# Patient Record
Sex: MALE | Race: WHITE | Employment: FULL TIME | URBAN - METROPOLITAN AREA
[De-identification: names, ages, dates, MRNs, and addresses within clinical notes are randomized per-mention and may not be internally consistent; named-entity substitution may affect disease eponyms.]

---

## 2020-12-07 ENCOUNTER — APPOINTMENT (OUTPATIENT)
Dept: GENERAL RADIOLOGY | Age: 85
DRG: 309 | End: 2020-12-07
Payer: MEDICARE

## 2020-12-07 ENCOUNTER — HOSPITAL ENCOUNTER (INPATIENT)
Age: 85
LOS: 2 days | Discharge: LEFT BEFORE DISCHARGE | DRG: 309 | End: 2020-12-09
Attending: INTERNAL MEDICINE | Admitting: INTERNAL MEDICINE
Payer: MEDICARE

## 2020-12-07 PROBLEM — I48.91 ATRIAL FIBRILLATION WITH RAPID VENTRICULAR RESPONSE (HCC): Status: ACTIVE | Noted: 2020-12-07

## 2020-12-07 LAB
ALBUMIN SERPL-MCNC: 4.6 G/DL (ref 3.5–4.6)
ALP BLD-CCNC: 104 U/L (ref 35–104)
ALT SERPL-CCNC: 13 U/L (ref 0–41)
ANION GAP SERPL CALCULATED.3IONS-SCNC: 12 MEQ/L (ref 9–15)
APTT: 33.9 SEC (ref 24.4–36.8)
AST SERPL-CCNC: 17 U/L (ref 0–40)
BASOPHILS ABSOLUTE: 0.1 K/UL (ref 0–0.2)
BASOPHILS RELATIVE PERCENT: 0.7 %
BILIRUB SERPL-MCNC: 0.3 MG/DL (ref 0.2–0.7)
BUN BLDV-MCNC: 43 MG/DL (ref 8–23)
CALCIUM SERPL-MCNC: 9.9 MG/DL (ref 8.5–9.9)
CHLORIDE BLD-SCNC: 95 MEQ/L (ref 95–107)
CO2: 27 MEQ/L (ref 20–31)
CREAT SERPL-MCNC: 1.87 MG/DL (ref 0.7–1.2)
EOSINOPHILS ABSOLUTE: 0.1 K/UL (ref 0–0.7)
EOSINOPHILS RELATIVE PERCENT: 1.3 %
GFR AFRICAN AMERICAN: 41.9
GFR NON-AFRICAN AMERICAN: 34.6
GLOBULIN: 3.8 G/DL (ref 2.3–3.5)
GLUCOSE BLD-MCNC: 226 MG/DL (ref 60–115)
GLUCOSE BLD-MCNC: 296 MG/DL (ref 70–99)
HCT VFR BLD CALC: 45.5 % (ref 42–52)
HEMOGLOBIN: 14.8 G/DL (ref 14–18)
INR BLD: 1
LYMPHOCYTES ABSOLUTE: 1.5 K/UL (ref 1–4.8)
LYMPHOCYTES RELATIVE PERCENT: 18 %
MCH RBC QN AUTO: 31.5 PG (ref 27–31.3)
MCHC RBC AUTO-ENTMCNC: 32.6 % (ref 33–37)
MCV RBC AUTO: 96.8 FL (ref 80–100)
MONOCYTES ABSOLUTE: 0.8 K/UL (ref 0.2–0.8)
MONOCYTES RELATIVE PERCENT: 10 %
NEUTROPHILS ABSOLUTE: 5.6 K/UL (ref 1.4–6.5)
NEUTROPHILS RELATIVE PERCENT: 70 %
PDW BLD-RTO: 17.7 % (ref 11.5–14.5)
PERFORMED ON: ABNORMAL
PLATELET # BLD: 236 K/UL (ref 130–400)
POTASSIUM SERPL-SCNC: 4.2 MEQ/L (ref 3.4–4.9)
PRO-BNP: 6992 PG/ML
PROTHROMBIN TIME: 13.5 SEC (ref 12.3–14.9)
RBC # BLD: 4.71 M/UL (ref 4.7–6.1)
SODIUM BLD-SCNC: 134 MEQ/L (ref 135–144)
TOTAL CK: 69 U/L (ref 0–190)
TOTAL PROTEIN: 8.4 G/DL (ref 6.3–8)
TROPONIN: 0.03 NG/ML (ref 0–0.01)
TROPONIN: 0.03 NG/ML (ref 0–0.01)
TSH SERPL DL<=0.05 MIU/L-ACNC: 2.92 UIU/ML (ref 0.44–3.86)
WBC # BLD: 8.1 K/UL (ref 4.8–10.8)

## 2020-12-07 PROCEDURE — 96375 TX/PRO/DX INJ NEW DRUG ADDON: CPT

## 2020-12-07 PROCEDURE — 6370000000 HC RX 637 (ALT 250 FOR IP): Performed by: NURSE PRACTITIONER

## 2020-12-07 PROCEDURE — 2500000003 HC RX 250 WO HCPCS: Performed by: PHYSICIAN ASSISTANT

## 2020-12-07 PROCEDURE — 99285 EMERGENCY DEPT VISIT HI MDM: CPT

## 2020-12-07 PROCEDURE — 85730 THROMBOPLASTIN TIME PARTIAL: CPT

## 2020-12-07 PROCEDURE — 2580000003 HC RX 258: Performed by: PHYSICIAN ASSISTANT

## 2020-12-07 PROCEDURE — 93005 ELECTROCARDIOGRAM TRACING: CPT | Performed by: EMERGENCY MEDICINE

## 2020-12-07 PROCEDURE — 71045 X-RAY EXAM CHEST 1 VIEW: CPT

## 2020-12-07 PROCEDURE — 84443 ASSAY THYROID STIM HORMONE: CPT

## 2020-12-07 PROCEDURE — 83880 ASSAY OF NATRIURETIC PEPTIDE: CPT

## 2020-12-07 PROCEDURE — 82550 ASSAY OF CK (CPK): CPT

## 2020-12-07 PROCEDURE — 80053 COMPREHEN METABOLIC PANEL: CPT

## 2020-12-07 PROCEDURE — 84484 ASSAY OF TROPONIN QUANT: CPT

## 2020-12-07 PROCEDURE — 2060000000 HC ICU INTERMEDIATE R&B

## 2020-12-07 PROCEDURE — 85610 PROTHROMBIN TIME: CPT

## 2020-12-07 PROCEDURE — 85025 COMPLETE CBC W/AUTO DIFF WBC: CPT

## 2020-12-07 PROCEDURE — 36415 COLL VENOUS BLD VENIPUNCTURE: CPT

## 2020-12-07 PROCEDURE — 96365 THER/PROPH/DIAG IV INF INIT: CPT

## 2020-12-07 PROCEDURE — 2580000003 HC RX 258: Performed by: NURSE PRACTITIONER

## 2020-12-07 RX ORDER — SODIUM CHLORIDE 0.9 % (FLUSH) 0.9 %
10 SYRINGE (ML) INJECTION EVERY 12 HOURS SCHEDULED
Status: DISCONTINUED | OUTPATIENT
Start: 2020-12-07 | End: 2020-12-09 | Stop reason: HOSPADM

## 2020-12-07 RX ORDER — DEXTROSE MONOHYDRATE 25 G/50ML
12.5 INJECTION, SOLUTION INTRAVENOUS PRN
Status: DISCONTINUED | OUTPATIENT
Start: 2020-12-07 | End: 2020-12-07 | Stop reason: SDUPTHER

## 2020-12-07 RX ORDER — ALBUTEROL SULFATE 90 UG/1
2 AEROSOL, METERED RESPIRATORY (INHALATION) EVERY 4 HOURS PRN
COMMUNITY
Start: 2020-11-27

## 2020-12-07 RX ORDER — PRAVASTATIN SODIUM 40 MG
40 TABLET ORAL DAILY
COMMUNITY
Start: 2020-11-27

## 2020-12-07 RX ORDER — NICOTINE POLACRILEX 4 MG
15 LOZENGE BUCCAL PRN
Status: DISCONTINUED | OUTPATIENT
Start: 2020-12-07 | End: 2020-12-09 | Stop reason: HOSPADM

## 2020-12-07 RX ORDER — DEXTROSE MONOHYDRATE 50 MG/ML
100 INJECTION, SOLUTION INTRAVENOUS PRN
Status: DISCONTINUED | OUTPATIENT
Start: 2020-12-07 | End: 2020-12-07 | Stop reason: SDUPTHER

## 2020-12-07 RX ORDER — SODIUM CHLORIDE 9 MG/ML
INJECTION, SOLUTION INTRAVENOUS CONTINUOUS
Status: DISCONTINUED | OUTPATIENT
Start: 2020-12-07 | End: 2020-12-08

## 2020-12-07 RX ORDER — SODIUM CHLORIDE 0.9 % (FLUSH) 0.9 %
10 SYRINGE (ML) INJECTION PRN
Status: DISCONTINUED | OUTPATIENT
Start: 2020-12-07 | End: 2020-12-09 | Stop reason: HOSPADM

## 2020-12-07 RX ORDER — ACETAMINOPHEN 325 MG/1
650 TABLET ORAL EVERY 6 HOURS PRN
Status: DISCONTINUED | OUTPATIENT
Start: 2020-12-07 | End: 2020-12-09 | Stop reason: HOSPADM

## 2020-12-07 RX ORDER — FUROSEMIDE 40 MG/1
40 TABLET ORAL 2 TIMES DAILY
COMMUNITY
Start: 2020-11-27

## 2020-12-07 RX ORDER — ASPIRIN 81 MG/1
81 TABLET ORAL DAILY
COMMUNITY
Start: 2020-11-27

## 2020-12-07 RX ORDER — DILTIAZEM HYDROCHLORIDE 5 MG/ML
10 INJECTION INTRAVENOUS ONCE
Status: COMPLETED | OUTPATIENT
Start: 2020-12-07 | End: 2020-12-07

## 2020-12-07 RX ORDER — 0.9 % SODIUM CHLORIDE 0.9 %
1000 INTRAVENOUS SOLUTION INTRAVENOUS ONCE
Status: COMPLETED | OUTPATIENT
Start: 2020-12-07 | End: 2020-12-07

## 2020-12-07 RX ORDER — METOPROLOL SUCCINATE 25 MG/1
25 TABLET, EXTENDED RELEASE ORAL DAILY
COMMUNITY
Start: 2020-11-27

## 2020-12-07 RX ORDER — SOTALOL HYDROCHLORIDE 80 MG/1
80 TABLET ORAL 2 TIMES DAILY
COMMUNITY
Start: 2020-11-27

## 2020-12-07 RX ORDER — POLYETHYLENE GLYCOL 3350 17 G/17G
17 POWDER, FOR SOLUTION ORAL DAILY PRN
Status: DISCONTINUED | OUTPATIENT
Start: 2020-12-07 | End: 2020-12-09 | Stop reason: HOSPADM

## 2020-12-07 RX ORDER — ACETAMINOPHEN 650 MG/1
650 SUPPOSITORY RECTAL EVERY 6 HOURS PRN
Status: DISCONTINUED | OUTPATIENT
Start: 2020-12-07 | End: 2020-12-09 | Stop reason: HOSPADM

## 2020-12-07 RX ORDER — DEXTROSE MONOHYDRATE 50 MG/ML
100 INJECTION, SOLUTION INTRAVENOUS PRN
Status: DISCONTINUED | OUTPATIENT
Start: 2020-12-07 | End: 2020-12-09 | Stop reason: HOSPADM

## 2020-12-07 RX ORDER — NICOTINE POLACRILEX 4 MG
15 LOZENGE BUCCAL PRN
Status: DISCONTINUED | OUTPATIENT
Start: 2020-12-07 | End: 2020-12-07 | Stop reason: SDUPTHER

## 2020-12-07 RX ORDER — FAMOTIDINE 20 MG/1
20 TABLET, FILM COATED ORAL 2 TIMES DAILY
Status: DISCONTINUED | OUTPATIENT
Start: 2020-12-07 | End: 2020-12-09 | Stop reason: HOSPADM

## 2020-12-07 RX ORDER — GLIMEPIRIDE 1 MG/1
2 TABLET ORAL
COMMUNITY
Start: 2020-11-27

## 2020-12-07 RX ORDER — ALLOPURINOL 100 MG/1
200 TABLET ORAL DAILY
COMMUNITY
Start: 2020-11-27

## 2020-12-07 RX ORDER — IRBESARTAN 300 MG/1
300 TABLET ORAL DAILY
COMMUNITY
Start: 2020-11-27

## 2020-12-07 RX ORDER — PROMETHAZINE HYDROCHLORIDE 12.5 MG/1
12.5 TABLET ORAL EVERY 6 HOURS PRN
Status: DISCONTINUED | OUTPATIENT
Start: 2020-12-07 | End: 2020-12-08

## 2020-12-07 RX ORDER — DEXTROSE MONOHYDRATE 25 G/50ML
12.5 INJECTION, SOLUTION INTRAVENOUS PRN
Status: DISCONTINUED | OUTPATIENT
Start: 2020-12-07 | End: 2020-12-09 | Stop reason: HOSPADM

## 2020-12-07 RX ORDER — ONDANSETRON 2 MG/ML
4 INJECTION INTRAMUSCULAR; INTRAVENOUS EVERY 6 HOURS PRN
Status: DISCONTINUED | OUTPATIENT
Start: 2020-12-07 | End: 2020-12-08

## 2020-12-07 RX ADMIN — FAMOTIDINE 20 MG: 20 TABLET ORAL at 22:25

## 2020-12-07 RX ADMIN — SODIUM CHLORIDE: 9 INJECTION, SOLUTION INTRAVENOUS at 22:25

## 2020-12-07 RX ADMIN — DILTIAZEM HYDROCHLORIDE 10 MG: 5 INJECTION INTRAVENOUS at 15:23

## 2020-12-07 RX ADMIN — DILTIAZEM HYDROCHLORIDE 5 MG/HR: 5 INJECTION INTRAVENOUS at 16:11

## 2020-12-07 RX ADMIN — SODIUM CHLORIDE 1000 ML: 9 INJECTION, SOLUTION INTRAVENOUS at 17:18

## 2020-12-07 ASSESSMENT — ENCOUNTER SYMPTOMS
VOICE CHANGE: 0
ANAL BLEEDING: 0
COUGH: 0
EYE DISCHARGE: 0
SHORTNESS OF BREATH: 0
BACK PAIN: 0
APNEA: 0
PHOTOPHOBIA: 0
ABDOMINAL DISTENTION: 0
VOMITING: 0
NAUSEA: 0

## 2020-12-07 NOTE — ED NOTES
Vlad Rowe PA aware of heart rate in the 80-90's and gave verbal order to run Cardizem drip at 5mg/hr instead of 10.      Sharda Miller RN  12/07/20 1385

## 2020-12-07 NOTE — CARE COORDINATION
[facility] Case Management Initial Discharge Assessment    Met with patient to discuss discharge plan. PCP: No primary care provider on file. Date of Last Visit:  Patient did not want to discuss PCP. If no PCP, list provided? Declined    Discharge Planning    Living Arrangements: independently at home    Who do you live with? Alone    Who helps you with your care:  self    If lives at home:     Do you have any barriers navigating in your home? no    Patient can perform ADL? Yes    Current Services (outpatient and in home) :  None    Dialysis: No    Is transportation available to get to your appointments? Yes    DME Equipment:  no    Respiratory equipment: None    Respiratory provider:  NISHI     Pharmacy:  yes - [facility] with Medication Assistance Program?  No      Patient agreeable to [place] [id number]? Declined    Patient agreeable to SNF/Rehab? Declined    Other discharge needs identified? N/A    Freedom of choice list provided with basic dialogue that supports the patient's individualized plan of care/goals and shares the quality data associated with the providers. N/A    Does Patient Have a High-Risk for Readmission Diagnosis (CHF, PN, MI, COPD)? No    The plan for Transition of Care is related to the following treatment goals: Eval/mgmt of acute cardiac issues prior to discharge. Initial Discharge Plan? (Note: please see concurrent daily documentation for any updates after initial note). Home    The Patient and/or patient representative: Patient was provided with choice of any post-acute providers for care and equipment and agrees with discharge plan to home. Declines any post-acute services.  N/A    Electronically signed by Jeannette Garcia RN on 12/7/2020 at 5:50 PM

## 2020-12-07 NOTE — ED NOTES
Patient resting in bed with no s/s of distress. Respirations even and unlabored. Denies any pain at this time. This nurse explained that her belongings can be locked up with security. Patient is declining to have her belongings go with security. Patient understands that [facility] cannot be responsible for lost or stolen items while the items are with the patient. Patient states understanding of this information. Dinner tray ordered. Patient provided warm blanket.       Monie Qureshi RN  12/07/20 3716

## 2020-12-07 NOTE — ED NOTES
Cardizem 10mg given via IV. Patient's heart rate decreased to 80-90's. Patient tolerated well; No s/s of distress. Patient states he was feeling dizzy today but denies these symptoms at this time. Respirations even and unlabored.       Tony Mckee RN  12/07/20 0661

## 2020-12-07 NOTE — ACP (ADVANCE CARE PLANNING)
Advance Care Planning     Advance Care Planning Activator (Inpatient)  Conversation Note      Date of ACP Conversation: 12/7/2020    Conversation Conducted with: Patient with Decision Making Capacity    ACP Activator: [id number] makes decisions on behalf of the incapacitated patient: Decision Maker is asked to consider and make decisions based on patient values, known preferences, or best interests. Health Care Decision Maker:     Current Designated Health Care Decision Maker:    Primary Decision Maker: Iris Cowden - Child - [phone]  (If there is a [address] named in the [facility] Makers\" box in the ACP activity, but it is not visible above, be sure to open that field and then select the health care decision maker relationship (ie \"primary\") in the blank space to the right of the name.) Validate  this information as still accurate & up-to-date; edit [place] field as needed.)    Note: Assess and validate information in current ACP documents, as indicated. Care Preferences    Ventilation: \"If you were in your present state of health and suddenly became very ill and were unable to breathe on your own, what would your preference be about the use of a ventilator (breathing machine) if it were available to you? \"      Would the patient desire the use of ventilator (breathing machine)?: yes    \"If your health worsens and it becomes clear that your chance of recovery is unlikely, what would your preference be about the use of a ventilator (breathing machine) if it were available to you? \"     Would the patient desire the use of ventilator (breathing machine)?: No    Resuscitation  \"CPR works best to restart the heart when there is a sudden event, like a heart attack, in someone who is otherwise healthy.  Unfortunately, CPR does not typically restart the heart for people who have serious health conditions or who are very sick.\"    \"In the event your heart stopped as a result of an underlying serious health condition, would you want attempts to be made to restart your heart (answer \"yes\" for attempt to resuscitate) or would you prefer a natural death (answer \"no\" for do not attempt to resuscitate)? \" yes    NOTE: If the patient has a valid advance directive AND now provides care preference(s) that are inconsistent with that prior directive, advise the patient to consider either: creating a new advance directive that complies with state-specific requirements; or, if that is not possible, orally revoking that prior directive in accordance with state-specific requirements, which must be documented in the EHR. [x] Yes   [] No   Educated Patient / Lashaun Bueno regarding differences between Advance Directives and portable DNR orders. Length of ACP Conversation in minutes:      Conversation Outcomes:  [x] ACP discussion completed  [] Existing advance directive reviewed with patient; no changes to patient's previously recorded wishes  [] New Advance Directive completed  [] Portable Do Not Rescitate prepared for Provider review and signature  [] POLST/POST/MOLST/MOST prepared for Provider review and signature      Follow-up plan:    [] Schedule follow-up conversation to continue planning  [] Referred individual to Provider for additional questions/concerns   [] Advised patient/agent/surrogate to review completed ACP document and update if needed with changes in condition, patient preferences or care setting    [] This note routed to one or more involved healthcare providers      NOTE: Patient declines to complete Advance Directives at this time. He states his \"kids can figure that out. \" CM re-educated patient about the purpose of AD's; patient continues to decline blank forms or spiritual care consult.

## 2020-12-07 NOTE — H&P
Hospitalist History and Physical Note  Name: Luis Luevano  Age: 82 y.o. Gender: male  CodeStatus: No Order  Allergies: Penicillins  Sulfa Antibiotics    Chief Complaint:Other ([facility]  they did ekg which was abnormal so they sent him here  patient has no complaints  other than [facility] here )    Primary Care Provider: No primary care provider on file. InpatientTreatment Team: Treatment Team: Attending Provider: Alessia Durán DO; Physician Assistant: Reza Ureña PA-C; Registered Nurse: Nilson Damon RN  Admission Date: 12/7/2020      Subjective: 29-year-old male with pmhx Afib on eliquis, DM type II, CHF, dilated cardiomyopathy, HTN, CKD stage III, COPD, hpl presented to ED from outpatient appt at [facility]. During routing EKG he was found to be in Afib with RVR with a rate in 150s. Patient states he felt \"off\" but did not have any chest pain, shortness of breath, N/V or diaphoresis. Reports he was unaware his heart rate was elevated. Upon arrival to ED, EKG was done he was in Aflutter 2:1 with RBBB and L fascicular block. Troponin was elevated 0.027, an ARMANDO was noted. He was given a bolus of cardizem 10mg and than started on cardizem drip at 10mg per hour. Rate decreased to 112. Patient states he feels fine and wants to get out of here but understands the need for admission. Reports he is followed closely at [facility] but is fine with admission at [address] [facility]. Last ECHO was done Oct 2020-  (Technically difficult exam due to suboptimal positioning.  - Exam indication: Acute on chronic chf  - The left ventricle is normal in size. There is moderate left ventricular   hypertrophy. Left ventricular systolic function is severely decreased. EF = 27 ±   5% (2D 4-ch.) Definity contrast used for endocardial border detection. Grade II   left ventricular diastolic dysfunction.  - The right ventricle is dilated. Right ventricular systolic function is   moderately decreased.   - The left atrial cavity is mildly dilated. - The right atrial cavity is dilated. - There is no patent foramen ovale as detected by Doppler.  - Estimated right ventricular systolic pressure is likely underestimated due to a   weak or incomplete tricuspid regurgitation signal and is, at least, 48 mmHg   consistent with mild pulmonary hypertension. Estimated right atrial pressure is 15   mmHg based on IVC assessment.)    Care everywhere reviewed      Physical Exam  Constitutional:       Appearance: Normal appearance. HENT:      Head: Normocephalic and atraumatic. Right Ear: External ear normal.      Left Ear: External ear normal.      Ears:      Comments: bilat hearing aides     Nose: Nose normal.      Mouth/Throat:      Mouth: Mucous membranes are dry. Eyes:      Conjunctiva/sclera: Conjunctivae normal.   Neck:      Musculoskeletal: Neck supple. Cardiovascular:      Rate and Rhythm: Rhythm irregular. Pulses: Normal pulses. Pulmonary:      Effort: Pulmonary effort is normal.      Breath sounds: Normal breath sounds. Abdominal:      General: Bowel sounds are normal.      Palpations: Abdomen is soft. Musculoskeletal: Normal range of motion. Skin:     General: Skin is warm. Capillary Refill: Capillary refill takes 2 to 3 seconds. Coloration: Skin is pale. Neurological:      Mental Status: He is alert and oriented to person, place, and time. Comments: Hard of hearing   Psychiatric:         Mood and Affect: Mood normal.         Review of Systems    No past medical history on file. No past surgical history on file. Social History    None        No family history on file. Medications:  Reviewed  Prior to Admission medications    Medication Sig Start Date End Date Taking?  Authorizing Provider   apixaban (ELIQUIS) 5 MG TABS tablet Take 5 mg by mouth 2 times daily 11/27/20  Yes Historical Provider, MD   allopurinol (ZYLOPRIM) 100 MG tablet Take 200 mg by mouth daily 11/27/20  Yes Historical Provider, MD   albuterol sulfate  (90 Base) MCG/ACT inhaler Inhale 2 puffs into the lungs every 4 hours as needed 11/27/20  Yes Historical Provider, MD   furosemide (LASIX) 40 MG tablet Take 40 mg by mouth 2 times daily 11/27/20  Yes Historical Provider, MD   aspirin (ECOTRIN LOW STRENGTH) 81 MG EC tablet Take 81 mg by mouth daily 11/27/20  Yes Historical Provider, MD   glimepiride (AMARYL) 1 MG tablet Take 2 mg by mouth daily (with breakfast) 11/27/20  Yes Historical Provider, MD   irbesartan (AVAPRO) 300 MG tablet Take 300 mg by mouth daily 11/27/20  Yes Historical Provider, MD   metFORMIN (GLUCOPHAGE) 500 MG tablet Take 500 mg by mouth 2 times daily (with meals) 11/27/20  Yes Historical Provider, MD   metoprolol succinate (TOPROL XL) 25 MG extended release tablet Take 25 mg by mouth daily Pt unsure if he takes metoprolol or betapace 11/27/20  Yes Historical Provider, MD   pravastatin (PRAVACHOL) 40 MG tablet Take 40 mg by mouth daily 11/27/20  Yes Historical Provider, MD   sotalol (BETAPACE) 80 MG tablet Take 80 mg by mouth 2 times daily Pt unsure if he takes metoprolol or betapace 11/27/20  Yes Historical Provider, MD       Current Facility-Administered Medications   Medication Dose Route Frequency Provider Last Rate Last Dose    dilTIAZem 125 mg in dextrose 5 % 125 mL infusion  10 mg/hr Intravenous Continuous Varinderjadyn Arevalo PA-C 5 mL/hr at 12/07/20 1611 5 mg/hr at 12/07/20 1611    0.9 % sodium chloride bolus  1,000 mL Intravenous Once Varinder Arevalo PA-C 500 mL/hr at 12/07/20 1718 1,000 mL at 12/07/20 1718     Current Outpatient Medications   Medication Sig Dispense Refill    apixaban (ELIQUIS) 5 MG TABS tablet Take 5 mg by mouth 2 times daily      allopurinol (ZYLOPRIM) 100 MG tablet Take 200 mg by mouth daily      albuterol sulfate  (90 Base) MCG/ACT inhaler Inhale 2 puffs into the lungs every 4 hours as needed      furosemide (LASIX) 40 MG tablet Take 40 mg by mouth 2 times daily  aspirin (ECOTRIN LOW STRENGTH) 81 MG EC tablet Take 81 mg by mouth daily      glimepiride (AMARYL) 1 MG tablet Take 2 mg by mouth daily (with breakfast)      irbesartan (AVAPRO) 300 MG tablet Take 300 mg by mouth daily      metFORMIN (GLUCOPHAGE) 500 MG tablet Take 500 mg by mouth 2 times daily (with meals)      metoprolol succinate (TOPROL XL) 25 MG extended release tablet Take 25 mg by mouth daily Pt unsure if he takes metoprolol or betapace      pravastatin (PRAVACHOL) 40 MG tablet Take 40 mg by mouth daily      sotalol (BETAPACE) 80 MG tablet Take 80 mg by mouth 2 times daily Pt unsure if he takes metoprolol or betapace          Infusion Medications:    dilTIAZem (CARDIZEM) 125 mg in dextrose 5% 125 mL infusion 5 mg/hr (12/07/20 1611)     Scheduled Medications:    sodium chloride  1,000 mL Intravenous Once     PRN Meds:     Labs:   Recent Labs     12/07/20  1445   WBC 8.1   HGB 14.8   HCT 45.5        Recent Labs     12/07/20  1445   *   K 4.2   CL 95   CO2 27   BUN 43*   CREATININE 1.87*   CALCIUM 9.9     Recent Labs     12/07/20  1445   AST 17   ALT 13   BILITOT 0.3   ALKPHOS 104     Recent Labs     12/07/20  1445   INR 1.0     Recent Labs     12/07/20  1445   CKTOTAL 69   TROPONINI 0.027*       Urinalysis:   No results found for: Jasper Roulette, BACTERIA, RBCUA, BLOODU, [address], [address]    Radiology:   Most recent    Chest CT      WITH CONTRAST:No results found for this or any previous visit. WITHOUT CONTRAST: No results found for this or any previous visit. CXR      2-view: No results found for this or any previous visit. Portable:   Results for orders placed during the hospital encounter of 12/07/20   XR CHEST PORTABLE    Narrative Exam: XR CHEST PORTABLE    History:  atrial fibrillation     Technique: AP portable view of the chest obtained. Comparison: None    Chest x-ray portable   Findings: The cardiac silhouette is enlarged.  Aorta is mildly ectatic which may be secondary to chronic hypertension. There are no infiltrates, consolidations or effusions. Bones of the thorax appear intact. Impression No radiographic evidence of acute intrathoracic process. Cardiomegaly. Echo No results found for this or any previous visit. Assessment/Plan:      A fib  With  RVR: Hemodynamically stable. Goal is to rate control with IV meds and maintain control with PO meds. Cardiology consulted as pt will require outpatient follow up. Telemetry. Goal K and Mg 4.0 and 2.0, respectively. Hold parameters for BP and HR in place. TSH level, Continue eliquis. Fall precautions and assessment of gait stability. EKG in am     Elevated trops likely due to accumulation 2ndry to decreased renal excretion as a result of ARMANDO. Will trend and continue to assess. Cardio consult to exclude ACS or CAD. Goal K and Mg 4.0 and 2.0, respectively. If indicated, will need to replace K carefully in setting of renal insufficiency. EKG in AM. Telemetry ordered. Gentle IVF    HTN: resume home meds    Chronic systolic heart failure:  Non ischemic Heart failure with LVEF 27%; : Goal K and Mg 4.0 and 2.0, respectively. On ASA, statin, BB,  ACEI/ARB. Telemetry. Daily weights, fluid restriction, Cardiac diet. Monitor for signs/ symptoms of volume overload. Elevate lower exts while at rest. Check BNP    DM type II: resume home meds, POCT ACHS, hypoglycemia protocol, SSI, check A1C    COPD: home meds    Active Hospital Problems    Diagnosis Date Noted    Atrial fibrillation with rapid ventricular response ([facility] [address] [I48.91] 12/07/2020       Additional work up or/and treatment plan may be added today or then after based on clinical progression. I am managing a portion of pt care. Some medical issues are handled byother specialists. Additional work up and treatment should be done in out pt setting by pt PCP and other out pt providers.      In addition to examining and evaluating pt, I spent additional time explaining care, normaland abnormal findings, and treatment plan. All of pt questions were answered. Counseling, diet and education were provided. Case will be discussed with nursing staff when appropriate. Family will be updated if and whenappropriate.       Electronically signed by CHRISSY Pope NP on 12/7/2020 at 6:00 PM

## 2020-12-07 NOTE — ED PROVIDER NOTES
[facility]  eMERGENCY dEPARTMENT eNCOUnter      Pt Name: Tarik Muñoz  MRN: [id number]  [place] 1935  Date of evaluation: 12/7/2020  Provider: Ethan Zelaya PA-C    CHIEF COMPLAINT       Chief Complaint   Patient presents with    Other     [facility]  they did ekg which was abnormal so they sent him here  patient has no complaints  other than [facility] here          HISTORY OF PRESENT ILLNESS   (Location/Symptom, Timing/Onset,Context/Setting, Quality, Duration, Modifying Factors, Severity)  Note limiting factors. Tarik Muñoz is a 82 y.o. male who presents to the emergency department and was regular [facility] appointment did an EKG was abnormal and sent patient to emergency room he has history of A. fib for 20 years he does take blood thinners, Eliquis. He did note some shaking on arrival but has had none since he denies chest pain back pain abdominal pain nausea vomiting. He has no complaints at this time symptoms moderate severity nothing improves or worsens his symptoms. HPI    NursingNotes were reviewed. REVIEW OF SYSTEMS    (2-9 systems for level 4, 10 or more for level 5)     Review of Systems   Constitutional: Negative for activity change, appetite change, chills, fever and unexpected weight change. HENT: Negative for ear discharge, nosebleeds and voice change. Eyes: Negative for photophobia and discharge. Respiratory: Negative for apnea, cough and shortness of breath. Cardiovascular: Negative for chest pain and leg swelling. Gastrointestinal: Negative for abdominal distention, anal bleeding, nausea and vomiting. Endocrine: Negative for cold intolerance, heat intolerance and polyphagia. Genitourinary: Negative for genital sores. Musculoskeletal: Negative for back pain, joint swelling and neck pain. Skin: Negative for pallor. Allergic/Immunologic: Negative for immunocompromised state. Neurological: Positive for tremors. Negative for seizures and facial asymmetry. Hematological: Does not bruise/bleed easily. Psychiatric/Behavioral: Negative for behavioral problems, self-injury and sleep disturbance. All other systems reviewed and are negative. Except as noted above the remainder of the review of systems was reviewed and negative. PAST MEDICAL HISTORY     Past Medical History:   Diagnosis Date    A-fib ([facility] [address]          SURGICALHISTORY     No past surgical history on file. CURRENT MEDICATIONS       Current Discharge Medication List      CONTINUE these medications which have NOT CHANGED    Details   apixaban (ELIQUIS) 5 MG TABS tablet Take 5 mg by mouth 2 times daily      allopurinol (ZYLOPRIM) 100 MG tablet Take 200 mg by mouth daily      albuterol sulfate  (90 Base) MCG/ACT inhaler Inhale 2 puffs into the lungs every 4 hours as needed      furosemide (LASIX) 40 MG tablet Take 40 mg by mouth 2 times daily      aspirin (ECOTRIN LOW STRENGTH) 81 MG EC tablet Take 81 mg by mouth daily      glimepiride (AMARYL) 1 MG tablet Take 2 mg by mouth daily (with breakfast)      irbesartan (AVAPRO) 300 MG tablet Take 300 mg by mouth daily      metFORMIN (GLUCOPHAGE) 500 MG tablet Take 500 mg by mouth 2 times daily (with meals)      metoprolol succinate (TOPROL XL) 25 MG extended release tablet Take 25 mg by mouth daily Pt unsure if he takes metoprolol or betapace      pravastatin (PRAVACHOL) 40 MG tablet Take 40 mg by mouth daily      sotalol (BETAPACE) 80 MG tablet Take 80 mg by mouth 2 times daily Pt unsure if he takes metoprolol or betapace             ALLERGIES     Penicillins and Sulfa antibiotics    FAMILY HISTORY     No family history on file.        SOCIAL HISTORY       Social History     Socioeconomic History    Marital status:      Spouse name: Not on file    Number of children: Not on file    Years of education: Not on file    Highest education level: Not on file   Occupational History    Not on and reactive to light. Neck:      Musculoskeletal: Normal range of motion and neck supple. Cardiovascular:      Rate and Rhythm: Tachycardia present. Pulses: Normal pulses. Heart sounds: Normal heart sounds. Pulmonary:      Effort: Pulmonary effort is normal. No respiratory distress. Breath sounds: Normal breath sounds. No stridor. Chest:      Chest wall: No tenderness. Abdominal:      General: Bowel sounds are normal. There is no distension. Palpations: Abdomen is soft. Tenderness: There is no abdominal tenderness. Musculoskeletal: Normal range of motion. Skin:     General: Skin is warm. Findings: No erythema. Neurological:      Mental Status: He is alert and oriented to person, place, and time. Psychiatric:         Mood and Affect: Mood normal.         DIAGNOSTIC RESULTS     EKG: All EKG's are interpreted by the Emergency Department Physician who either signs or Co-signsthis chart in the absence of a cardiologist.    EKG atrial flutter rate 147.  ms  ms    RADIOLOGY:   Non-plain filmimages such as CT, Ultrasound and MRI are read by the radiologist. Plain radiographic images are visualized and preliminarily interpreted by the emergency physician with the below findings:       Interpretation per the Radiologist below, if available at the time ofthis note:    XR CHEST PORTABLE   Final Result   No radiographic evidence of acute intrathoracic process. Cardiomegaly.                   ED BEDSIDE ULTRASOUND:   Performed by ED Physician - none    LABS:  Labs Reviewed   COMPREHENSIVE METABOLIC PANEL - Abnormal; Notable for the following components:       Result Value    Sodium 134 (*)     Glucose 296 (*)     BUN 43 (*)     CREATININE 1.87 (*)     GFR Non- 34.6 (*)     GFR  41.9 (*)     Total Protein 8.4 (*)     Globulin 3.8 (*)     All other components within normal limits   CBC WITH AUTO DIFFERENTIAL - Abnormal; Notable for the DISPOSITION/PLAN   DISPOSITION        PATIENT REFERRED TO:  No follow-up provider specified.     DISCHARGE MEDICATIONS:  Current Discharge Medication List             (Please note that portions of this note were completed with a voice recognition program.  Efforts were made to edit the dictations but occasionally words are mis-transcribed.)    Yarelis Mariee PA-C (electronically signed)  Attending Emergency Physician       Yarelis Mariee PA-C  12/08/20 0139

## 2020-12-07 NOTE — ED NOTES
Pt changed into gown. Pt given urinal upon request for when he needs to urinate. Pt reports he takes Eliquis Two times per day. Pt denies pain. Pt a & o x 4.       Sarah Beth Salmeron, RN  12/07/20 6860 [place] Lev, RN  12/07/20 0496

## 2020-12-08 LAB
ANION GAP SERPL CALCULATED.3IONS-SCNC: 14 MEQ/L (ref 9–15)
BASOPHILS ABSOLUTE: 0 K/UL (ref 0–0.2)
BASOPHILS RELATIVE PERCENT: 0.5 %
BUN BLDV-MCNC: 29 MG/DL (ref 8–23)
CALCIUM SERPL-MCNC: 9.1 MG/DL (ref 8.5–9.9)
CHLORIDE BLD-SCNC: 98 MEQ/L (ref 95–107)
CO2: 22 MEQ/L (ref 20–31)
CREAT SERPL-MCNC: 1.4 MG/DL (ref 0.7–1.2)
EKG ATRIAL RATE: 108 BPM
EKG ATRIAL RATE: 294 BPM
EKG ATRIAL RATE: 308 BPM
EKG P AXIS: 20 DEGREES
EKG P-R INTERVAL: 184 MS
EKG P-R INTERVAL: 224 MS
EKG Q-T INTERVAL: 378 MS
EKG Q-T INTERVAL: 392 MS
EKG Q-T INTERVAL: 482 MS
EKG QRS DURATION: 134 MS
EKG QRS DURATION: 148 MS
EKG QRS DURATION: 150 MS
EKG QTC CALCULATION (BAZETT): 497 MS
EKG QTC CALCULATION (BAZETT): 591 MS
EKG QTC CALCULATION (BAZETT): 631 MS
EKG R AXIS: -66 DEGREES
EKG R AXIS: -73 DEGREES
EKG R AXIS: -78 DEGREES
EKG T AXIS: 86 DEGREES
EKG T AXIS: 92 DEGREES
EKG T AXIS: 93 DEGREES
EKG VENTRICULAR RATE: 103 BPM
EKG VENTRICULAR RATE: 147 BPM
EKG VENTRICULAR RATE: 97 BPM
EOSINOPHILS ABSOLUTE: 0.1 K/UL (ref 0–0.7)
EOSINOPHILS RELATIVE PERCENT: 0.9 %
GFR AFRICAN AMERICAN: 58.5
GFR NON-AFRICAN AMERICAN: 48.3
GLUCOSE BLD-MCNC: 180 MG/DL (ref 60–115)
GLUCOSE BLD-MCNC: 181 MG/DL (ref 60–115)
GLUCOSE BLD-MCNC: 285 MG/DL (ref 70–99)
HCT VFR BLD CALC: 40.6 % (ref 42–52)
HEMOGLOBIN: 13.1 G/DL (ref 14–18)
INR BLD: 1.1
LV EF: 25 %
LVEF MODALITY: NORMAL
LYMPHOCYTES ABSOLUTE: 0.9 K/UL (ref 1–4.8)
LYMPHOCYTES RELATIVE PERCENT: 11.1 %
MAGNESIUM: 2.1 MG/DL (ref 1.7–2.4)
MCH RBC QN AUTO: 31.1 PG (ref 27–31.3)
MCHC RBC AUTO-ENTMCNC: 32.3 % (ref 33–37)
MCV RBC AUTO: 96.4 FL (ref 80–100)
MONOCYTES ABSOLUTE: 0.7 K/UL (ref 0.2–0.8)
MONOCYTES RELATIVE PERCENT: 9.2 %
NEUTROPHILS ABSOLUTE: 6.1 K/UL (ref 1.4–6.5)
NEUTROPHILS RELATIVE PERCENT: 78.3 %
PDW BLD-RTO: 17.5 % (ref 11.5–14.5)
PERFORMED ON: ABNORMAL
PERFORMED ON: ABNORMAL
PLATELET # BLD: 223 K/UL (ref 130–400)
POTASSIUM SERPL-SCNC: 4.4 MEQ/L (ref 3.4–4.9)
PROTHROMBIN TIME: 14.2 SEC (ref 12.3–14.9)
RBC # BLD: 4.21 M/UL (ref 4.7–6.1)
SODIUM BLD-SCNC: 134 MEQ/L (ref 135–144)
TROPONIN: 0.04 NG/ML (ref 0–0.01)
WBC # BLD: 7.8 K/UL (ref 4.8–10.8)

## 2020-12-08 PROCEDURE — 6370000000 HC RX 637 (ALT 250 FOR IP): Performed by: INTERNAL MEDICINE

## 2020-12-08 PROCEDURE — 93010 ELECTROCARDIOGRAM REPORT: CPT | Performed by: INTERNAL MEDICINE

## 2020-12-08 PROCEDURE — 85610 PROTHROMBIN TIME: CPT

## 2020-12-08 PROCEDURE — 6370000000 HC RX 637 (ALT 250 FOR IP): Performed by: NURSE PRACTITIONER

## 2020-12-08 PROCEDURE — 84484 ASSAY OF TROPONIN QUANT: CPT

## 2020-12-08 PROCEDURE — 93306 TTE W/DOPPLER COMPLETE: CPT

## 2020-12-08 PROCEDURE — 36415 COLL VENOUS BLD VENIPUNCTURE: CPT

## 2020-12-08 PROCEDURE — 83735 ASSAY OF MAGNESIUM: CPT

## 2020-12-08 PROCEDURE — 80048 BASIC METABOLIC PNL TOTAL CA: CPT

## 2020-12-08 PROCEDURE — 85025 COMPLETE CBC W/AUTO DIFF WBC: CPT

## 2020-12-08 PROCEDURE — 2700000000 HC OXYGEN THERAPY PER DAY

## 2020-12-08 PROCEDURE — 93005 ELECTROCARDIOGRAM TRACING: CPT | Performed by: INTERNAL MEDICINE

## 2020-12-08 PROCEDURE — 2580000003 HC RX 258: Performed by: NURSE PRACTITIONER

## 2020-12-08 PROCEDURE — 93005 ELECTROCARDIOGRAM TRACING: CPT | Performed by: NURSE PRACTITIONER

## 2020-12-08 PROCEDURE — 2060000000 HC ICU INTERMEDIATE R&B

## 2020-12-08 RX ORDER — SODIUM CHLORIDE 9 MG/ML
INJECTION, SOLUTION INTRAVENOUS CONTINUOUS
Status: CANCELLED | OUTPATIENT
Start: 2020-12-09

## 2020-12-08 RX ORDER — PRAVASTATIN SODIUM 40 MG
40 TABLET ORAL DAILY
Status: DISCONTINUED | OUTPATIENT
Start: 2020-12-08 | End: 2020-12-09 | Stop reason: HOSPADM

## 2020-12-08 RX ORDER — LOSARTAN POTASSIUM 25 MG/1
25 TABLET ORAL DAILY
Status: DISCONTINUED | OUTPATIENT
Start: 2020-12-08 | End: 2020-12-09 | Stop reason: HOSPADM

## 2020-12-08 RX ORDER — SODIUM CHLORIDE 0.9 % (FLUSH) 0.9 %
10 SYRINGE (ML) INJECTION PRN
Status: CANCELLED | OUTPATIENT
Start: 2020-12-08

## 2020-12-08 RX ORDER — METOPROLOL SUCCINATE 25 MG/1
25 TABLET, EXTENDED RELEASE ORAL DAILY
Status: DISCONTINUED | OUTPATIENT
Start: 2020-12-08 | End: 2020-12-09 | Stop reason: HOSPADM

## 2020-12-08 RX ORDER — FUROSEMIDE 40 MG/1
40 TABLET ORAL 2 TIMES DAILY
Status: DISCONTINUED | OUTPATIENT
Start: 2020-12-08 | End: 2020-12-09 | Stop reason: HOSPADM

## 2020-12-08 RX ORDER — SODIUM CHLORIDE 0.9 % (FLUSH) 0.9 %
10 SYRINGE (ML) INJECTION EVERY 12 HOURS SCHEDULED
Status: CANCELLED | OUTPATIENT
Start: 2020-12-08

## 2020-12-08 RX ORDER — SOTALOL HYDROCHLORIDE 80 MG/1
80 TABLET ORAL 2 TIMES DAILY
Status: DISCONTINUED | OUTPATIENT
Start: 2020-12-08 | End: 2020-12-09 | Stop reason: HOSPADM

## 2020-12-08 RX ORDER — ASPIRIN 81 MG/1
81 TABLET ORAL DAILY
Status: DISCONTINUED | OUTPATIENT
Start: 2020-12-08 | End: 2020-12-09 | Stop reason: HOSPADM

## 2020-12-08 RX ORDER — ALLOPURINOL 100 MG/1
200 TABLET ORAL DAILY
Status: DISCONTINUED | OUTPATIENT
Start: 2020-12-08 | End: 2020-12-09 | Stop reason: HOSPADM

## 2020-12-08 RX ADMIN — FAMOTIDINE 20 MG: 20 TABLET ORAL at 08:04

## 2020-12-08 RX ADMIN — Medication 10 ML: at 08:05

## 2020-12-08 RX ADMIN — FUROSEMIDE 40 MG: 40 TABLET ORAL at 08:05

## 2020-12-08 RX ADMIN — FAMOTIDINE 20 MG: 20 TABLET ORAL at 20:00

## 2020-12-08 RX ADMIN — ASPIRIN 81 MG: 81 TABLET, COATED ORAL at 08:04

## 2020-12-08 RX ADMIN — APIXABAN 2.5 MG: 2.5 TABLET, FILM COATED ORAL at 20:00

## 2020-12-08 RX ADMIN — LOSARTAN POTASSIUM 25 MG: 25 TABLET, FILM COATED ORAL at 08:04

## 2020-12-08 RX ADMIN — FUROSEMIDE 40 MG: 40 TABLET ORAL at 18:32

## 2020-12-08 RX ADMIN — INSULIN LISPRO 6 UNITS: 100 INJECTION, SOLUTION INTRAVENOUS; SUBCUTANEOUS at 12:49

## 2020-12-08 RX ADMIN — ALLOPURINOL 200 MG: 100 TABLET ORAL at 08:04

## 2020-12-08 RX ADMIN — METOPROLOL SUCCINATE 25 MG: 25 TABLET, EXTENDED RELEASE ORAL at 08:05

## 2020-12-08 RX ADMIN — ACETAMINOPHEN 650 MG: 325 TABLET ORAL at 20:00

## 2020-12-08 RX ADMIN — PRAVASTATIN SODIUM 40 MG: 40 TABLET ORAL at 08:05

## 2020-12-08 RX ADMIN — APIXABAN 5 MG: 5 TABLET, FILM COATED ORAL at 08:04

## 2020-12-08 RX ADMIN — INSULIN LISPRO 2 UNITS: 100 INJECTION, SOLUTION INTRAVENOUS; SUBCUTANEOUS at 08:06

## 2020-12-08 RX ADMIN — Medication 10 ML: at 20:01

## 2020-12-08 ASSESSMENT — PAIN SCALES - GENERAL
PAINLEVEL_OUTOF10: 4
PAINLEVEL_OUTOF10: 0

## 2020-12-08 NOTE — PROGRESS NOTES
Hospitalist Daily Progress Note  Name: Sebastian Brennan  Age: 82 y.o. Gender: male  CodeStatus: Full Code  Allergies: Penicillins  Sulfa Antibiotics    Chief Complaint:Other (pt was at f for regular apoitment  they did ekg which was abnormal so they sent him here  patient has no complaints  other than [facility] sent him here )    Primary Care Provider: No primary care provider on file. InpatientTreatment Team: Treatment Team: Attending Provider: Bobo Armstrong DO; : Vidal King RN; Patient Care Tech: Precious Prasad; : DEBBI Covarrubias; Utilization Reviewer: Elba Carroll RN; Registered Nurse: Kade Louise RN; Consulting Physician: Rusty Maldonado MD  Admission Date: 12/7/2020      Subjective: Patient seen evaluated bedside. Heart rate remains in the 120s this morning at 5 mg/h of Cardizem. Patient is afebrile. Vitals are stable. Patient is very anxious to be discharged home. Patient follows with [facility] for cardiology and and [facility] for electrophysiology. Most recent EF shows 25% ±5% heart failure with elevated RVSP of 48. Physical Exam  Constitutional:       Appearance: Normal appearance. He is obese. HENT:      Head: Normocephalic and atraumatic. Nose: Nose normal.      Mouth/Throat:      Mouth: Mucous membranes are moist.      Pharynx: Oropharynx is clear. Eyes:      Conjunctiva/sclera: Conjunctivae normal.      Pupils: Pupils are equal, round, and reactive to light. Cardiovascular:      Rate and Rhythm: Tachycardia present. Rhythm irregular. Heart sounds: Murmur present. No friction rub. No gallop. Pulmonary:      Breath sounds: No wheezing, rhonchi or rales. Abdominal:      General: There is no distension. Tenderness: There is no abdominal tenderness. There is no guarding. Musculoskeletal: Normal range of motion. Right lower leg: No edema. Left lower leg: No edema. Neurological:      General: No focal deficit present.       Mental Status: He is alert and oriented to person, place, and time. Psychiatric:         Mood and Affect: Mood normal.         Thought Content: Thought content normal.         Judgment: Judgment normal.         Review of Systems  14 point ROS is reviewed negative except for as above  Medications:  Reviewed    Infusion Medications:    dilTIAZem (CARDIZEM) 125 mg in dextrose 5% 125 mL infusion 5 mg/hr (12/07/20 1611)    sodium chloride 50 mL/hr at 12/07/20 2225    dextrose       Scheduled Medications:    allopurinol  200 mg Oral Daily    furosemide  40 mg Oral BID    aspirin  81 mg Oral Daily    losartan  25 mg Oral Daily    metoprolol succinate  25 mg Oral Daily    pravastatin  40 mg Oral Daily    [Held by provider] sotalol  80 mg Oral BID    apixaban  2.5 mg Oral BID    sodium chloride flush  10 mL Intravenous 2 times per day    famotidine  20 mg Oral BID    insulin lispro  0-12 Units Subcutaneous TID WC    insulin lispro  0-6 Units Subcutaneous Nightly     PRN Meds: sodium chloride flush, acetaminophen **OR** acetaminophen, polyethylene glycol, glucose, dextrose, glucagon (rDNA), dextrose    Labs:   Recent Labs     12/07/20  1445 12/08/20  1051   WBC 8.1 7.8   HGB 14.8 13.1*   HCT 45.5 40.6*    223     Recent Labs     12/07/20  1445 12/08/20  1051   * 134*   K 4.2 4.4   CL 95 98   CO2 27 22   BUN 43* 29*   CREATININE 1.87* 1.40*   CALCIUM 9.9 9.1     Recent Labs     12/07/20  1445   AST 17   ALT 13   BILITOT 0.3   ALKPHOS 104     Recent Labs     12/07/20  1445 12/08/20  0308   INR 1.0 1.1     Recent Labs     12/07/20  1445 12/07/20  2248 12/08/20  0308   CKTOTAL 69  --   --    TROPONINI 0.027* 0.029* 0.035*       Urinalysis:   No results found for: Elgie Reels, BACTERIA, RBCUA, BLOODU, [address], GLUCOSEU    Radiology:   Most recent    Chest CT      WITH CONTRAST:No results found for this or any previous visit. WITHOUT CONTRAST: No results found for this or any previous visit.     CXR 2-view: No results found for this or any previous visit. Portable:   Results for orders placed during the hospital encounter of 12/07/20   XR CHEST PORTABLE    Narrative Exam: XR CHEST PORTABLE    History:  atrial fibrillation     Technique: AP portable view of the chest obtained. Comparison: None    Chest x-ray portable   Findings: The cardiac silhouette is enlarged. Aorta is mildly ectatic which may be secondary to chronic hypertension. There are no infiltrates, consolidations or effusions. Bones of the thorax appear intact. Impression No radiographic evidence of acute intrathoracic process. Cardiomegaly. Echo No results found for this or any previous visit. Assessment/Plan:    Active Hospital Problems    Diagnosis Date Noted    Atrial fibrillation with rapid ventricular response ([facility] [address] [I48.91] 12/07/2020     Atrial fibrillation with RVR: Continue Cardizem infusion. Cardiology following. Continue full dose anticoagulation. Plan for possible cardioversion tomorrow. Defer sotalol administration to cardiology. Acute kidney injury: Creatinine down to 1.4 from 1.9 in the emergency department yesterday continue gentle hydration as I can presume the creatinine elevation as well as lack of rate control is due to dehydration. 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 Cardiology following. Continue close electrolyte monitoring. COPD without exacerbation: Resume home regimen  Pulmonary hypertension: Avoid volume overload  Hypertension: Resume  home regimen  Anticoagulation with Eliquis    Additional work up or/and treatment plan may be added today or then after based on clinical progression. I am managing a portion of pt care. Some medical issues are handled byother specialists.  Additional work up and treatment should be done in out pt setting by pt PCP and other out pt providers. In addition to examining and evaluating pt, I spent additional time explaining care, normaland abnormal findings, and treatment plan. All of pt questions were answered. Counseling, diet and education were provided. Case will be discussed with nursing staff when appropriate. Family will be updated if and whenappropriate.       Electronically signed by Jovanni Painting DO on 12/8/2020 at 12:59 PM

## 2020-12-08 NOTE — CONSULTS
Cardiology Consult Note      Date:   12/8/2020  Patient name:  Miguel Lozada  Date of admission:  12/7/2020  2:39 PM  MRN:   [id number]  YOB: 1935  Time of Consult:  10:54 AM    Consulting Cardiologist:Dr. Aletha Lamar APRN-CNP  Primary Cardiologist:  Dr. Luis Ayala    Referring Provider: Dr. Veronica Blunt MD    Consult Reason:  A Fib    Assessment  1. Atrial Flutter: On antiarrhythmic sotalol and Metoprolol Succinate. Currently on Cardizem drip . 2. Long term anticoagulation: Eliquis  3. Elevated troponin:  0.027, 0.029, 0.035. Doubt plaque rupture due to flat pattern and history of CKD  4. HTN  5. CHF  6. Dilated cardiomyopathy  7. CKD III:  Bun 43/Creat 1.87  8. COPD  9. Pulmonary HTN: 11- ECHO RVSP 49    Plan:  1. Monitor on Telemetry  2. Obtain Echocardiogram   3. Monitor and replace electrolytes as needed. Suggest maintain K+ =/> 4.0 and Mag =/> 2.0   4. Possible cardioversion tomorrow  5. Further recommendations per Dr. Chad Cedeño         HPI:   Miguel Lozada is a 82 y.o.  male patient who is being at the request of Dr. Andry Renee  for inpatient consultation of atrial fibrillation. He was admitted on 12/7/2020. Previous [address] [facility] and [address] [place] records have been reviewed in detail. Hola Gonzalez is a 82 yr old male with a PMH of persistent atrial fibrillation on Eliquis, HTN, CHF, dilated cardiomyopathy, CKD stage III, COPD, pulmonary hypertension, dyslipidemia,  DM II, follows with Dr. Ernst You. He presented to the emergency room 12/7/2020 after he was sent from his [facility] appointment where an EKG was obtained that was abnormal and he was advised to come to the emergency room. EKG obtained in the emergency room identified atrial flutter with a 2-1 AV conduction rate 147 bpm.  Labs; , K4.2, BUN 43, creatinine 1.87, GFR 34.6, glucose 226, BNP 6992, troponins 0.027, 0.029, 0.035, WBCs 8.1, Hgb 14.8, HCT 45.5, platelets 12.3.   Chest x-ray no evidence of acute intrathoracic process, cardiomegaly. He states that he has had atrial fib for years and that he goes in and out of rhythm. He states that he was at his [facility] cardiologist Dr. Glen Smart office yesterday where they did an EKG and sent him to the ER. He states that over all he has been feeling well other than jittery. He admits to increased stress lately with his company that may be contributing to his heart rate. He states that he is compliant with his medications and has not missed any doses. He is physically active and still works full time. Denies any recent illness, fever, cough. Denies chest pain, palpitations, lightheadedness, dizziness, shortness of breath, or edema. He is currently sitting in a chair in his room very anxious to go home. Continues on Cardizem qtt from ER. Cardiac History:   5/9/2007; cardiac cath; normal no intervention  4/8/2016 abnormal Lexiscan, LVEF 44%  11/11/2016 echocardiogram; LVEF 50 to 34%, LV diastolic dysfunction, RVSP 49  3/29/2016 successful cardioversion    [facility] Office note 11-4-2020  Subjective: 1 year follow up 82-year-old  male who is followed for persistent atrial fibrillation and monomorphic PVCs controlled on a combination of sotalol and metoprolol and anticoagulated with Eliquis. Patient states that he was doing well until few weeks prior to last weekend. He started noticing shortness of breath and significant edema in the lower extremities. He had in the hospital at [facility] facility. Patient states that he was diuresed significantly losing approximately 15 pounds. He feels much better. Also he states that he was cardioverted at that time. Current EKG shows sinus rhythm left anterior fascicular block right bundle branch block at a rate of 67 bpm. Cures duration 174 ms. QT corrected 420 ms. Rhythm strip shows the same pattern. General: The patient was alert and oriented x3. Head: Normocephalic. HEENT: Normal.  Neck: Supple.  No JVD.  Lungs: Clear to auscultation and percussion. Cardiovascular: Regular rate and rhythm. Abdomen: Soft, bowel sounds present. Extremities: No edema in the lower extremity. Neurologic: alert, oriented x3 , no motor or sensory deficits  Skin : no cyanosis or pallor. CLINICAL IMPRESSIONS:   1. Persistent atrial fibrillation, controlled on sotalol and beta  blockade and anticoagulated with Eliquis. 2. Hypertension, controlled with a blood pressure today of 124/62. 3. Radiofrequency catheter ablation of the right ventricular outflow  tract for the treatment of right ventricular outflow tract  tachycardia in May 11, 2007. 4. 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 5. Diabetes mellitus type 2.  6. Pulmonary hypertension with right ventricular systolic pressure  of 49 mmHg per 2D echo. 7. Normal coronaries per left heart catheterization dated May 9,  2007. 8. Dyslipidemia. 9. Normal left ventricular function with an ejection fraction of 50%  +/- 5% per 2D echo dated November 11, 2016. 10. COPD    Recommendations: We will get records from [facility] facility. Apparently patient dose of beta-blockers was increased from 25 mg 1 tablet daily to 1-1/2 tablets daily. He was kept on the same dose of sotalol therapy. If he has recurrence of atrial fibrillation, the dose of sotalol will be increased. Follow-up in office in 4 weeks or sooner if needed    Patient is states that he is using Lasix 40 mg 1 tablet twice a day. We will get a BMP later this week. Risk factor modifications and lifestyle modifications discussed with patient. Diet , exercise and hydration discussed during this office visit. I spent more than 25 minutes discussing current electrophysiology - cardiology issues with patient, physiology, medical therapy and possible procedures as well as coordination of care.  I have also reviewed records of procedures/test and hospitalizations/office notes since last office visit. [facility] medication list 11/4/2020  1. Accu-Chek Diamond Plus In Vitro Strip; Therapy: [id number] to Recorded   2. Albuterol Sulfate HFA AERS; Therapy: (Recorded:04Nov2020) to Recorded   3. Allopurinol 100 MG Oral Tablet; take as directed; Therapy: [id number] to Recorded   4. Aspirin 81 MG Oral Tablet; Therapy: (Recorded:04Nov2020) to Recorded   5. Cialis TABS; Therapy: (Recorded:09May2019) to Recorded   6. Citracal Plus Oral Tablet; Therapy: (Recorded:09May2019) to Recorded   7. Eliquis 5 MG Oral Tablet; TAKE 1 TABLET TWICE DAILY; Therapy: (Recorded:10Mar2016) to Recorded   8. Fluticasone-Salmeterol 113-14 MCG/ACT Inhalation Aerosol Powder Breath Activated;   take as directed; Therapy: 12Apr2019 to Recorded   9. Furosemide 40 MG Oral Tablet; TAKE 1 TABLET DAILY as needed for weight gain,   shortness of breath or swelling; Therapy: (Recorded:26Oct2016) to Recorded   10. Glimepiride 1 MG Oral Tablet; TAKE 1 TABLET DAILY; Therapy: [id number] to Recorded   11. Irbesartan 300 MG Oral Tablet; TAKE 1 TABLET DAILY; Therapy: [id number] to Recorded   12. Metformin 500 mg; 1 tab twice a day; Therapy: (Recorded:21Jan2015) to Recorded   13. Metoprolol Succinate ER 25 MG Oral Tablet Extended Release 24 Hour; take 1 tablet    daily; Therapy: (Recorded:04Nov2020) to Recorded   14. Multivitamins TABS; TAKE 1 TABLET DAILY; Therapy: (Recorded:09May2019) to Recorded   15. Omeprazole 20 MG Oral Capsule Delayed Release; Therapy: [id number] to Recorded   16. Pravachol 40 MG Oral Tablet; TAKE 1 TABLET DAILY; Therapy: [id number] to  Requested for: 11Jun2007 Recorded   17. Sotalol HCl - 80 MG Oral Tablet; TAKE 1 TABLET EVERY 12 HOURS DAILY; Therapy: (Recorded:08Apr2016) to Recorded        Allergies:   Allergies   Allergen Reactions    Penicillins     Sulfa Antibiotics        Past Medical History:  Past Medical History:   Diagnosis Date    A-fib [facility])        Past Surgical History:  No past surgical history on file. Family History:   No family history on file. Social  History:     Social History     Tobacco Use    Smoking status: Not on file   Substance Use Topics    Alcohol use: Not on file       Home Medications:    Prior to Admission medications    Medication Sig Start Date End Date Taking?  Authorizing Provider   apixaban (ELIQUIS) 5 MG TABS tablet Take 5 mg by mouth 2 times daily 11/27/20  Yes Historical Provider, MD   allopurinol (ZYLOPRIM) 100 MG tablet Take 200 mg by mouth daily 11/27/20  Yes Historical Provider, MD   albuterol sulfate  (90 Base) MCG/ACT inhaler Inhale 2 puffs into the lungs every 4 hours as needed 11/27/20  Yes Historical Provider, MD   furosemide (LASIX) 40 MG tablet Take 40 mg by mouth 2 times daily 11/27/20  Yes Historical Provider, MD   aspirin (ECOTRIN LOW STRENGTH) 81 MG EC tablet Take 81 mg by mouth daily 11/27/20  Yes Historical Provider, MD   glimepiride (AMARYL) 1 MG tablet Take 2 mg by mouth daily (with breakfast) 11/27/20  Yes Historical Provider, MD   irbesartan (AVAPRO) 300 MG tablet Take 300 mg by mouth daily 11/27/20  Yes Historical Provider, MD   metFORMIN (GLUCOPHAGE) 500 MG tablet Take 500 mg by mouth 2 times daily (with meals) 11/27/20  Yes Historical Provider, MD   metoprolol succinate (TOPROL XL) 25 MG extended release tablet Take 25 mg by mouth daily Pt unsure if he takes metoprolol or betapace 11/27/20  Yes Historical Provider, MD   pravastatin (PRAVACHOL) 40 MG tablet Take 40 mg by mouth daily 11/27/20  Yes Historical Provider, MD   sotalol (BETAPACE) 80 MG tablet Take 80 mg by mouth 2 times daily Pt unsure if he takes metoprolol or betapace 11/27/20  Yes Historical Provider, MD       Current Medications:   dilTIAZem (CARDIZEM) 125 mg in dextrose 5% 125 mL infusion 5 mg/hr (12/07/20 1611)    sodium chloride 50 mL/hr at 12/07/20 2225    dextrose         IV Medications:  Current Facility-Administered Medications Medication Dose Route Frequency Provider Last Rate Last Dose    allopurinol (ZYLOPRIM) tablet 200 mg  200 mg Oral Daily Berenda Yukon D Sedar, DO   200 mg at 12/08/20 0804    furosemide (LASIX) tablet 40 mg  40 mg Oral BID Norleen Cindy Sedar, DO   40 mg at 12/08/20 0805    aspirin EC tablet 81 mg  81 mg Oral Daily Edgardo D Sedar, DO   81 mg at 12/08/20 0804    losartan (COZAAR) tablet 25 mg  25 mg Oral Daily Edgardo D Sedar, DO   25 mg at 12/08/20 6566    metoprolol succinate (TOPROL XL) extended release tablet 25 mg  25 mg Oral Daily Norleen Cindy Sedar, DO   25 mg at 12/08/20 0805    pravastatin (PRAVACHOL) tablet 40 mg  40 mg Oral Daily Norleen Cindy Sedar, DO   40 mg at 12/08/20 0805    [Held by provider] sotalol (BETAPACE) tablet 80 mg  80 mg Oral BID Norleen Cindy Sedar, DO        apixaban (ELIQUIS) tablet 2.5 mg  2.5 mg Oral BID Norleen Cindy Sedar, DO        dilTIAZem 125 mg in dextrose 5 % 125 mL infusion  10 mg/hr Intravenous Continuous Tracy Ness PA-C 5 mL/hr at 12/07/20 1611 5 mg/hr at 12/07/20 1611    sodium chloride flush 0.9 % injection 10 mL  10 mL Intravenous 2 times per day CHRISSY Dong - NP   10 mL at 12/08/20 0805    sodium chloride flush 0.9 % injection 10 mL  10 mL Intravenous PRN Connor Navarro APRN - NP        acetaminophen (TYLENOL) tablet 650 mg  650 mg Oral Q6H PRN CHRISSY Dong - GIAN        Or   [facility] acetaminophen (TYLENOL) suppository 650 mg  650 mg Rectal Q6H PRN Connor Navarro APRN - GIAN        polyethylene glycol (GLYCOLAX) packet 17 g  17 g Oral Daily PRN Connor Navarro APRN - GIAN        famotidine (PEPCID) tablet 20 mg  20 mg Oral BID CHRISSY Dong - NP   20 mg at 12/08/20 0804    0.9 % sodium chloride infusion   Intravenous Continuous ConnorCHRISSY Verdugo NP 50 mL/hr at 12/07/20 2225      insulin lispro (HUMALOG) injection vial 0-12 Units  0-12 Units Subcutaneous TID  CHRISSY Dong NP   2 Units at 12/08/20 0806    insulin lispro (HUMALOG) injection vial 0-6 Units 0-6 Units Subcutaneous Nightly Josué Allegra, APRN - NP   2 Units at 12/07/20 2221    glucose (GLUTOSE) 40 % oral gel 15 g  15 g Oral PRN Josué Allegra, APRN - NP        dextrose 50 % IV solution  12.5 g Intravenous PRN Josué Allegra, APRN - NP        glucagon (rDNA) injection 1 mg  1 mg Intramuscular PRN Josué Allegra, APRN - NP        dextrose 5 % solution  100 mL/hr Intravenous PRN Josué Allegra, APRN - NP           Review of Systems  Constitutional: No weight loss, fever, chills, weakness or fatigue. HEENT: No visual loss, blurred vision, double vision or yellow sclerae. Skin: No rash or itching  Cardiovascular:  No chest pain, pressure or discomfort. No palpitations or edema. Respiratory:  No shortness of breath, cough or sputum. Gastrointestinal:  No anorexia, nausea, vomiting or diarrhea. No abdominal pain. No bloody or dark tarry stools. Neurological:  No headache, dizziness, syncope,   Musculoskeletal:  No muscle, back pain, joint pain or stiffness. Hematologic: No anemia, bleeding. Positive for bruising. Endocrinologic:  No reports of sweating, cold or heat intolerance. Vital Signs:  Vitals:    12/07/20 1929 12/08/20 0758 12/08/20 0932 12/08/20 1050   BP: (!) 152/104 125/80     Pulse: 62 112 112    Resp: 18 18     Temp: 97.6 °F (36.4 °C) 97.3 °F (36.3 °C)     TempSrc: Oral Oral     SpO2: 97% 94%     Weight:    176 lb (79.8 kg)   Height:    5' 11.75\" (1.822 m)       Intake/Output Summary (Last 24 hours) at 12/8/2020 1054  Last data filed at 12/8/2020 0758  Gross per 24 hour   Intake 360 ml   Output --   Net 360 ml       Patient Vitals for the past 96 hrs (Last 3 readings):   Weight   12/08/20 1050 176 lb (79.8 kg)       Physical exam   Constitutional:  Well developed, awake/alert/oriented x3, no distress, alert and cooperative. Eyes:  PERRL, sclera clear, conjunctiva pink. EMMT:  mucous membranes  moist, no apparent injury, no lesion seen.    Head/Neck:  Neck supple, no apparent injury,  No JVD, trachea midline,  Respiratory/Thorax: Patent airways, CTAB,  normal breath sounds with good chest expansion, thorax symmetric. Cardiovascular: irregular, rate and rhythm, no murmurs, 2+ equal pulses of the extremities, normal S1 and S2, PMI non displaced. Gastrointestinal:  Non distended, soft, non-tender, no rebound tenderness or guarding,   Genitourinary:  deferred  Musculoskeletal:  No apparent injury. Extremities:  Normal extremities, no cyanosis, edema,   Neurological:  Alert and oriented x3. Moves extremities spontaneous with purpose  Psychological:  Anxious to go home   Skin:  Warm and dry,     Diagnostics:    EK2020   Atrial flutter with 2:1 AV conduction  147 bpm  Right bundle branch block  Left anterior fascicular block   Bifascicular block   Abnormal ECG  No previous ECGs available      Telemetry: atrial flutter. Lab Data:  BMP:  Recent Labs     20  1445   *   K 4.2   CL 95   CO2 27   BUN 43*   CREATININE 1.87*   LABGLOM 34.6*       CBC:  Recent Labs     20  1445   WBC 8.1   RBC 4.71   HGB 14.8   HCT 45.5   MCV 96.8   MCH 31.5*   MCHC 32.6*   RDW 17.7*          Cardiac Enzymes:   Recent Labs     20  1445 20  2248 20  0308   CKTOTAL 69  --   --    TROPONINI 0.027* 0.029* 0.035*       Hepatic Function Panel:  Recent Labs     20  1445   ALKPHOS 104   ALT 13   AST 17   PROT 8.4*   BILITOT 0.3   LABALBU 4.6       Magnesium:  No results for input(s): MG in the last 72 hours. Pro-BNP:  Lab Results   Component Value Date    PROBNP 6,992 2020       INR:  Recent Labs     20  1445 20  0308   PROTIME 13.5 14.2   INR 1.0 1.1       TSH:  Lab Results   Component Value Date    TSH 2.920 2020       Lipid Profile:  No results found for: TRIG, HDL, LDLCALC, LDLDIRECT, LABVLDL    HgbA1C:  No results found for: LABA1C    Lactate Level:  No results for input(s): LACTA in the last 72 hours.     CMP:  Recent Labs 12/07/20  1445   *   K 4.2   CL 95   CO2 27   BUN 43*   CREATININE 1.87*   GLUCOSE 296*   CALCIUM 9.9   PROT 8.4*   LABALBU 4.6   BILITOT 0.3   ALKPHOS 104   AST 17   ALT 13       Amylase:  No results for input(s): AMYLASE in the last 72 hours. Lipase:  No results for input(s): LIPASE in the last 72 hours. ABG:  No results for input(s): PH, PO2, PCO2, HCO3, BE, O2SAT in the last 72 hours. Radiology:   XR CHEST PORTABLE   Final Result   No radiographic evidence of acute intrathoracic process. Cardiomegaly. Xr Chest Portable    Result Date: 12/7/2020  Exam: XR CHEST PORTABLE History:  atrial fibrillation Technique: AP portable view of the chest obtained. Comparison: None Chest x-ray portable Findings: The cardiac silhouette is enlarged. Aorta is mildly ectatic which may be secondary to chronic hypertension. There are no infiltrates, consolidations or effusions. Bones of the thorax appear intact. No radiographic evidence of acute intrathoracic process. Cardiomegaly. Impression:   Active Problems:    Atrial fibrillation with rapid ventricular response ([facility])  Resolved Problems:    * No resolved hospital problems. *    Patient Active Problem List   Diagnosis    Atrial fibrillation with rapid ventricular response ([facility] [address]           Thank you for the opportunity to participate in the care of your patient. Do not hesitate to call if you have any questions.     Electronically signed by CHRISSY Morales - CNP, FACC on 12/8/2020 at 10:54 AM

## 2020-12-09 VITALS
HEIGHT: 72 IN | OXYGEN SATURATION: 99 % | HEART RATE: 117 BPM | BODY MASS INDEX: 23.84 KG/M2 | SYSTOLIC BLOOD PRESSURE: 140 MMHG | WEIGHT: 176 LBS | DIASTOLIC BLOOD PRESSURE: 96 MMHG | TEMPERATURE: 97.1 F | RESPIRATION RATE: 18 BRPM

## 2020-12-09 LAB
BASOPHILS ABSOLUTE: 0.1 K/UL (ref 0–0.2)
BASOPHILS RELATIVE PERCENT: 0.9 %
EOSINOPHILS ABSOLUTE: 0.1 K/UL (ref 0–0.7)
EOSINOPHILS RELATIVE PERCENT: 1.8 %
GLUCOSE BLD-MCNC: 205 MG/DL (ref 60–115)
HCT VFR BLD CALC: 37.5 % (ref 42–52)
HEMOGLOBIN: 12.1 G/DL (ref 14–18)
LYMPHOCYTES ABSOLUTE: 1 K/UL (ref 1–4.8)
LYMPHOCYTES RELATIVE PERCENT: 15.3 %
MCH RBC QN AUTO: 31 PG (ref 27–31.3)
MCHC RBC AUTO-ENTMCNC: 32.3 % (ref 33–37)
MCV RBC AUTO: 96.1 FL (ref 80–100)
MONOCYTES ABSOLUTE: 0.7 K/UL (ref 0.2–0.8)
MONOCYTES RELATIVE PERCENT: 10.7 %
NEUTROPHILS ABSOLUTE: 4.5 K/UL (ref 1.4–6.5)
NEUTROPHILS RELATIVE PERCENT: 71.3 %
PDW BLD-RTO: 17.4 % (ref 11.5–14.5)
PERFORMED ON: ABNORMAL
PLATELET # BLD: 195 K/UL (ref 130–400)
RBC # BLD: 3.9 M/UL (ref 4.7–6.1)
WBC # BLD: 6.3 K/UL (ref 4.8–10.8)

## 2020-12-09 PROCEDURE — 85025 COMPLETE CBC W/AUTO DIFF WBC: CPT

## 2020-12-09 PROCEDURE — 2580000003 HC RX 258: Performed by: PHYSICIAN ASSISTANT

## 2020-12-09 PROCEDURE — 2500000003 HC RX 250 WO HCPCS: Performed by: PHYSICIAN ASSISTANT

## 2020-12-09 PROCEDURE — 36415 COLL VENOUS BLD VENIPUNCTURE: CPT

## 2020-12-09 RX ADMIN — DILTIAZEM HYDROCHLORIDE 10 MG/HR: 5 INJECTION INTRAVENOUS at 01:01

## 2020-12-09 NOTE — PROGRESS NOTES
Ashley Gao was seen at bedside with RN present. He is requesting/stating that he go home and is refusing to proceed with scheduled cardioversion. Explained the importance of him staying and to proceed with scheduled cardioversion, he is refusing to stay or agree to any procedures. States \"that he understands the risk of leaving with his heart, but because he has been here for a couple of day he is backed up on meetings with work that cannot be put off\". I discussed the importance of him seeing his cardiologist at [facility] immediately since he is signing out AMA, he states \"of course I will\". Dr. Sweetie Shepherd notified of him refusing to have cardioversion and signing out AMA.

## 2020-12-09 NOTE — FLOWSHEET NOTE
Patient has been ambulating in his room. He states that he wants to go home in the morning and see his cardiologist at [facility]. When I went in his room at 2200 to explain the procedure scheduled for 12/9/2020 and to go over the consent form he refused and stated that he will not sign the consent form and that he will not undergo said procedure because he was supposed to speak to Dr. Lyanne Barthel and she did not come in to see him as she said that she would. Patient has also had Ventricular rhythms on the monitor which he had been having throughout the entire day, per telemetry. He has not c/o pain or discomfort. Cardizem gtt is going at 10 mL/hr. Call light within reach.     Electronically signed by Johnny Venegas RN on 12/9/2020 at 3:26 AM

## 2020-12-09 NOTE — FLOWSHEET NOTE
Patient sined the AMA paper with myself and Taras Lynn in pts room. Pt insists that he has to go to his business meetings.

## 2020-12-10 ENCOUNTER — CARE COORDINATION (OUTPATIENT)
Dept: CARE COORDINATION | Age: 85
End: 2020-12-10

## 2020-12-10 NOTE — CARE COORDINATION
[facility] Transitions Follow Up Call    12/10/2020    Patient: Monica Moore  Patient : 1935   MRN: <[id number]>  Reason for Admission: Cardiac Arrhythmia / A-fib   Discharge Date: 20 RARS: Readmission Risk Score: 15         Spoke with: Attempted to contact patient for follow up BPCI-A transition call. Left HIPAA Compliant message on Voice mail to call office (number given) with any questions and an update on your condition since discharge. Will continue to follow. Dana Raines LPN    [phone]  [facility] / [address] Transitions Subsequent and Final Call    Subsequent and Final Calls  Care Transitions Interventions  Other Interventions: Follow Up  No future appointments.     Dana Raines LPN

## 2020-12-11 ENCOUNTER — CARE COORDINATION (OUTPATIENT)
Dept: CASE MANAGEMENT | Age: 85
End: 2020-12-11

## 2020-12-11 PROCEDURE — 1111F DSCHRG MED/CURRENT MED MERGE: CPT | Performed by: INTERNAL MEDICINE

## 2020-12-11 NOTE — CARE COORDINATION
[facility] Transitions Initial Follow Up Call    Call within 2 business days of discharge: Yes    Patient: Dorothy Stevens Patient : 1935   MRN: [id number]  Reason for Admission: -2020 [address] AF w/ RVR. Left AMA. Discharge Date: 20 RARS: Readmission Risk Score: 13    Med rec/1111F order compeleted  BPCI Transitions. Challenges to be reviewed by the provider   Additional needs identified to be addressed with provider No  none       Method of communication with provider : none    Advance Care Planning:   Does patient have an Advance Directive:  reviewed and current. Was this a readmission? No  Patient stated reason for admission: AF  Patients top risk factors for readmission: medical condition    Care Transition Nurse (CTN) contacted the patient by telephone to perform post hospital discharge assessment. Verified name and  with patient as identifiers. Provided introduction to self, and explanation of the CTN role. CTN reviewed discharge instructions, medical action plan and red flags with patient who verbalized understanding. Patient given an opportunity to ask questions and does not have any further questions or concerns at this time. Were discharge instructions available to patient? Yes. Reviewed appropriate site of care based on symptoms and resources available to patient including: Reviewed symptoms of AF to report/go to ED. Reviewed symptoms of CV-19 to report/go to ED. . The patient agrees to contact the PCP office for questions related to their healthcare. Medication reconciliation was performed with patient, who verbalizes understanding of administration of home medications. Advised obtaining a 90-day supply of all daily and as-needed medications.      Covid Risk Education    Patient has following risk factors of: AF.   Education provided regarding infection prevention, and signs and symptoms of COVID-19 and when to seek medical attention with patient who verbalized understanding. Discussed exposure protocols and quarantine From CDC: Are you at higher risk for severe illness?   and given an opportunity for questions and concerns. The patient agrees to contact the COVID-19 hotline [phone] or PCP office for questions related to COVID-19. For more information on steps you can take to protect yourself, see CDC's How to Protect Yourself     Patient/family/caregiver given information for [employer] and agrees to enroll no  Patient's preferred e-mail: declines  Patient's preferred phone number: declines    Discussed follow-up appointments. If no appointment was previously scheduled, appointment scheduling offered: Pt advised to schedule 1 wk PCP appt. Declines assist. Is follow up appointment scheduled within 7 days of discharge? Non-[facility] follow up appointment(s):     Plan for follow-up call in 5-7 days based on severity of symptoms and risk factors. Plan for next call: symptom management-AF  CTN provided contact information for future needs. Pt returns this writers call. Denies any chest pain/pressure, palpitation, or dizzy events. Denies any fever, chills, or flu-like symptoms. Denies any home, [facility], DME, or med needs. Advised to fu w/ cardiologist for cardiac med review, v/u. BPCI Transitions nurse to follow. Care Transitions 24 Hour Call    Do you have any ongoing symptoms?:  No  Do you have a copy of your discharge instructions?:  Yes  Do you have all of your prescriptions and are they filled?:  Yes  Have you scheduled your follow up appointment?:  No  Were you discharged with any Home Care or Post Acute Services:  No  Do you feel like you have everything you need to keep you well at home?:  Yes  Care Transitions Interventions         Follow Up  No future appointments.     Serafin Rader RN

## 2020-12-11 NOTE — CARE COORDINATION
[facility] Transitions Initial Follow Up Call    Call within 2 business days of discharge: Yes    Patient: Monalisa Ford Patient : 1935   MRN: [id number]  Reason for Admission: -2020 [address] AF w/ RVR. Left AMA. Discharge Date: 20 RARS: Readmission Risk Score: 13    BPCI Transitions. CTN requests call back to P: [phone] for initial outreach. Two call attempts made.

## 2020-12-14 ENCOUNTER — CARE COORDINATION (OUTPATIENT)
Dept: CASE MANAGEMENT | Age: 85
End: 2020-12-14

## 2020-12-14 NOTE — CARE COORDINATION
BPCI case transfer/routed to Vanessa Pope RN, for transition follow up. Initial call completed 12/11/2020. This CTN s/o.

## 2020-12-17 ENCOUNTER — CARE COORDINATION (OUTPATIENT)
Dept: CARE COORDINATION | Age: 85
End: 2020-12-17

## 2020-12-17 NOTE — CARE COORDINATION
[facility] Transitions Follow Up Call    2020    Patient: Tarik Muñoz  Patient : 1935   MRN: <[id number]>  Reason for Admission:   Discharge Date: 20 RARS: Readmission Risk Score: 15         Spoke with: Attempted to contact patient for follow up BPCI-A transition call. Left HIPAA Compliant message on Voice mail to call office (number given) with any questions and an update on your condition since discharge. Will continue to follow. Royce Sanchez LPN    [phone]  [address] [place] [address] / [address] [place] [address] Transitions Subsequent and Final Call    Subsequent and Final Calls  Care Transitions Interventions  Other Interventions: Follow Up  No future appointments.     Royce Sanchez LPN

## 2021-01-07 ENCOUNTER — CARE COORDINATION (OUTPATIENT)
Dept: CASE MANAGEMENT | Age: 86
End: 2021-01-07

## 2021-01-07 NOTE — CARE COORDINATION
[facility] Transitions Follow Up Call    2021    Patient: Madan Zaidi  Patient : 1935   MRN: <[id number]>  Reason for Admission:   Discharge Date: 20 RARS: Readmission Risk Score: 13         Attempted to contact patient for follow up BPCI-A transition call. Left voicemail message to return call with an update on condition since discharge. Contact information provided. Will continue to follow up. Patient returned call he stated \"I'm doing fine. Have a great day. Thank you. \" he then hung the phone up. Will continue to follow. Care Transitions Subsequent and Final Call    Subsequent and Final Calls  Care Transitions Interventions  Other Interventions: Follow Up  No future appointments.     Olivia Nogueira LPN

## 2021-01-15 ENCOUNTER — CARE COORDINATION (OUTPATIENT)
Dept: CASE MANAGEMENT | Age: 86
End: 2021-01-15

## 2021-01-15 NOTE — CARE COORDINATION
[facility] Transitions Follow Up Call    1/15/2021    Patient: José Luis Dominguez  Patient : 1935   MRN: <[id number]>  Reason for Admission:   Discharge Date: 20 RARS: Readmission Risk Score: 13    Follow Up: Attempted to contact patient for BPCI-A follow up. Unable to reach patient. Left message with contact information and request for call back. No future appointments.     Gracy Moyer RN

## 2021-01-15 NOTE — CARE COORDINATION
[facility] Transitions Follow Up Call    1/15/2021    Patient: Sukhjinder Valdez  Patient : 1935   MRN: <[id number]>  Reason for Admission:   Discharge Date: 20 RARS: Readmission Risk Score: 13    Follow Up:  Received return phone call from patient. Left message stating he is returning phone call. He stated everything has been fine. No new or worsening symptoms. Stated he just went out to get his hair cut and did fine. No questions or concerns at this time. Will continue to follow. No future appointments.     Raphael Sadler RN

## 2021-02-05 ENCOUNTER — CARE COORDINATION (OUTPATIENT)
Dept: CASE MANAGEMENT | Age: 86
End: 2021-02-05

## 2021-02-05 NOTE — CARE COORDINATION
[facility] Transitions Follow Up Call    2021    Patient: Abhilash Pitts  Patient : 1935   MRN: <[id number]>  Reason for Admission:   Discharge Date: 20 RARS: Readmission Risk Score: 13    Follow Up: Attempted to contact patient for BPCI-A follow up. Unable to reach patient. Left message with contact information and request for call back. No future appointments.     Amelia King RN

## 2021-02-18 ENCOUNTER — CARE COORDINATION (OUTPATIENT)
Dept: CASE MANAGEMENT | Age: 86
End: 2021-02-18

## 2021-02-18 NOTE — CARE COORDINATION
[facility] Transitions Follow Up Call    2021    Patient: Rula Morton  Patient : 1935   MRN: <[id number]>  Reason for Admission: afib with rvr  Discharge Date: 20 RARS: Readmission Risk Score: 13         Spoke with: Nima Jay:    Spoke with patient after 2 IDs. Pt returned phone call and stated he was doing \"fine\" Denies chest pain, palpitations, dizziness or SOB. Stated no needs currently      Care Transitions Subsequent and Final Call    Subsequent and Final Calls  Care Transitions Interventions  Other Interventions: Follow Up  No future appointments.     TEENA GoyalN, RN   [address] [place] [address] Transition Nurse  [phone]

## 2021-03-01 ENCOUNTER — CARE COORDINATION (OUTPATIENT)
Dept: CASE MANAGEMENT | Age: 86
End: 2021-03-01

## 2021-03-01 NOTE — CARE COORDINATION
[facility] Transitions Follow Up Call    3/1/2021    Patient: Francesca Yu  Patient : 1935   MRN: <[id number]>  Reason for Admission:   Discharge Date: 20 RARS: Readmission Risk Score: 13       Attempted to reach patient by phone regarding follow up; BPCI-A. HIPAA compliant message left on patient's voicemail; CTN contact information provided.      Boris Pryor RN

## 2021-03-10 ENCOUNTER — CARE COORDINATION (OUTPATIENT)
Dept: CASE MANAGEMENT | Age: 86
End: 2021-03-10

## 2021-03-10 NOTE — CARE COORDINATION
[facility] Transitions Follow Up Call    3/10/2021    Patient: Aniya Ge  Patient : 1935   MRN: <[id number]>  Reason for Admission:   Discharge Date: 20 RARS: Readmission Risk Score: 13       Attempted to reach patient by phone regarding follow up; BPCI-A. HIPAA compliant message left on patient's voicemail; CTN contact information provided. Please follow up with PCP for any immediate needs or concerns. Final call; no further outreach.       José Lara RN